# Patient Record
Sex: MALE | Race: WHITE | NOT HISPANIC OR LATINO | Employment: OTHER | URBAN - METROPOLITAN AREA
[De-identification: names, ages, dates, MRNs, and addresses within clinical notes are randomized per-mention and may not be internally consistent; named-entity substitution may affect disease eponyms.]

---

## 2017-10-17 ENCOUNTER — ESTABLISHED COMPREHENSIVE EXAM (OUTPATIENT)
Dept: URBAN - METROPOLITAN AREA CLINIC 39 | Facility: CLINIC | Age: 72
End: 2017-10-17

## 2017-10-17 DIAGNOSIS — H43.813: ICD-10-CM

## 2017-10-17 DIAGNOSIS — H26.491: ICD-10-CM

## 2017-10-17 DIAGNOSIS — H40.011: ICD-10-CM

## 2017-10-17 DIAGNOSIS — H26.492: ICD-10-CM

## 2017-10-17 PROCEDURE — 92133 CPTRZD OPH DX IMG PST SGM ON: CPT

## 2017-10-17 PROCEDURE — 92014 COMPRE OPH EXAM EST PT 1/>: CPT

## 2017-10-17 PROCEDURE — G8428 CUR MEDS NOT DOCUMENT: HCPCS

## 2017-10-17 PROCEDURE — 92015 DETERMINE REFRACTIVE STATE: CPT

## 2017-10-17 ASSESSMENT — TONOMETRY
OD_IOP_MMHG: 14
OS_IOP_MMHG: 14

## 2017-10-17 ASSESSMENT — VISUAL ACUITY
OD_SC: J1
OD_SC: 20/20
OS_GLARE: 20/80
OS_SC: J1
OS_SC: 20/20-1
OD_GLARE: 20/80

## 2017-11-15 ENCOUNTER — SURGERY/PROCEDURE (OUTPATIENT)
Dept: URBAN - METROPOLITAN AREA CLINIC 39 | Facility: CLINIC | Age: 72
End: 2017-11-15

## 2017-11-15 DIAGNOSIS — H26.491: ICD-10-CM

## 2017-11-15 PROCEDURE — 66821 AFTER CATARACT LASER SURGERY: CPT

## 2017-11-16 ENCOUNTER — ESTABLISHED PATIENT (OUTPATIENT)
Dept: URBAN - METROPOLITAN AREA CLINIC 39 | Facility: CLINIC | Age: 72
End: 2017-11-16

## 2017-11-16 ENCOUNTER — SURGERY/PROCEDURE (OUTPATIENT)
Dept: URBAN - METROPOLITAN AREA CLINIC 39 | Facility: CLINIC | Age: 72
End: 2017-11-16

## 2017-11-16 DIAGNOSIS — H26.492: ICD-10-CM

## 2017-11-16 PROCEDURE — G8428 CUR MEDS NOT DOCUMENT: HCPCS

## 2017-11-16 PROCEDURE — 92012 INTRM OPH EXAM EST PATIENT: CPT

## 2017-11-16 PROCEDURE — 66821 AFTER CATARACT LASER SURGERY: CPT

## 2017-11-16 ASSESSMENT — VISUAL ACUITY
OS_SC: 20/20-2
OD_SC: 20/20
OD_SC: J1
OS_SC: J1
OS_GLARE: 20/70

## 2017-11-16 ASSESSMENT — TONOMETRY
OD_IOP_MMHG: 10
OS_IOP_MMHG: 14

## 2017-11-17 ENCOUNTER — POST-OP (OUTPATIENT)
Dept: URBAN - METROPOLITAN AREA CLINIC 39 | Facility: CLINIC | Age: 72
End: 2017-11-17

## 2017-11-17 DIAGNOSIS — Z98.890: ICD-10-CM

## 2017-11-17 PROCEDURE — 99024 POSTOP FOLLOW-UP VISIT: CPT

## 2017-11-17 ASSESSMENT — TONOMETRY
OS_IOP_MMHG: 12
OD_IOP_MMHG: 11

## 2017-11-17 ASSESSMENT — VISUAL ACUITY
OD_SC: J1
OS_SC: 20/20-2
OS_SC: J1
OD_SC: 20/20-2

## 2018-05-21 ENCOUNTER — APPOINTMENT (OUTPATIENT)
Dept: URBAN - METROPOLITAN AREA CLINIC 200 | Age: 73
Setting detail: DERMATOLOGY
End: 2018-05-29

## 2018-05-21 DIAGNOSIS — L57.0 ACTINIC KERATOSIS: ICD-10-CM

## 2018-05-21 PROBLEM — L20.84 INTRINSIC (ALLERGIC) ECZEMA: Status: ACTIVE | Noted: 2018-05-21

## 2018-05-21 PROCEDURE — 17110 DESTRUCT B9 LESION 1-14: CPT

## 2018-05-21 PROCEDURE — OTHER LIQUID NITROGEN: OTHER

## 2018-05-21 ASSESSMENT — LOCATION SIMPLE DESCRIPTION DERM: LOCATION SIMPLE: LEFT LOWER BACK

## 2018-05-21 ASSESSMENT — LOCATION DETAILED DESCRIPTION DERM: LOCATION DETAILED: LEFT SUPERIOR MEDIAL LOWER BACK

## 2018-05-21 ASSESSMENT — LOCATION ZONE DERM: LOCATION ZONE: TRUNK

## 2018-05-21 NOTE — PROCEDURE: LIQUID NITROGEN
Post-Care Instructions: I reviewed with the patient in detail post-care instructions. Patient is to wear sunprotection, and avoid picking at any of the treated lesions. Pt may apply Vaseline to crusted or scabbing areas.
Include Z78.9 (Other Specified Conditions Influencing Health Status) As An Associated Diagnosis?: Yes
Add 52 Modifier (Optional): no
Medical Necessity Clause: This procedure was medically necessary because the lesions that were treated were: causing pain
Number Of Freeze-Thaw Cycles: 2 freeze-thaw cycles
Medical Necessity Information: It is in your best interest to select a reason for this procedure from the list below. All of these items fulfill various CMS LCD requirements except the new and changing color options.
Consent: The patient's consent was obtained including but not limited to risks of crusting, scabbing, blistering, scarring, darker or lighter pigmentary change, recurrence, incomplete removal and infection.
Detail Level: Detailed

## 2018-11-13 ENCOUNTER — ESTABLISHED COMPREHENSIVE EXAM (OUTPATIENT)
Dept: URBAN - METROPOLITAN AREA CLINIC 39 | Facility: CLINIC | Age: 73
End: 2018-11-13

## 2018-11-13 DIAGNOSIS — Z96.1: ICD-10-CM

## 2018-11-13 DIAGNOSIS — H40.013: ICD-10-CM

## 2018-11-13 DIAGNOSIS — H43.813: ICD-10-CM

## 2018-11-13 DIAGNOSIS — H04.123: ICD-10-CM

## 2018-11-13 PROCEDURE — G8428 CUR MEDS NOT DOCUMENT: HCPCS

## 2018-11-13 PROCEDURE — 92015 DETERMINE REFRACTIVE STATE: CPT

## 2018-11-13 PROCEDURE — 92133 CPTRZD OPH DX IMG PST SGM ON: CPT

## 2018-11-13 PROCEDURE — 92014 COMPRE OPH EXAM EST PT 1/>: CPT

## 2018-11-13 PROCEDURE — G8785 BP SCRN NO PERF AT INTERVAL: HCPCS

## 2018-11-13 ASSESSMENT — VISUAL ACUITY
OS_SC: J1
OD_SC: 20/20-2
OD_SC: J1
OS_SC: 20/20-1

## 2018-11-13 ASSESSMENT — TONOMETRY
OD_IOP_MMHG: 12
OS_IOP_MMHG: 12

## 2019-11-19 ENCOUNTER — ESTABLISHED COMPREHENSIVE EXAM (OUTPATIENT)
Dept: URBAN - METROPOLITAN AREA CLINIC 39 | Facility: CLINIC | Age: 74
End: 2019-11-19

## 2019-11-19 DIAGNOSIS — Z98.890: ICD-10-CM

## 2019-11-19 DIAGNOSIS — H40.013: ICD-10-CM

## 2019-11-19 DIAGNOSIS — H04.123: ICD-10-CM

## 2019-11-19 PROCEDURE — 92014 COMPRE OPH EXAM EST PT 1/>: CPT

## 2019-11-19 PROCEDURE — 92015 DETERMINE REFRACTIVE STATE: CPT

## 2019-11-19 PROCEDURE — 92133 CPTRZD OPH DX IMG PST SGM ON: CPT

## 2019-11-19 ASSESSMENT — VISUAL ACUITY
OD_SC: J1
OS_SC: J1
OD_SC: 20/20-2
OS_SC: 20/20-1

## 2019-11-19 ASSESSMENT — TONOMETRY
OD_IOP_MMHG: 20
OS_IOP_MMHG: 18

## 2020-11-17 ENCOUNTER — ESTABLISHED COMPREHENSIVE EXAM (OUTPATIENT)
Dept: URBAN - METROPOLITAN AREA CLINIC 39 | Facility: CLINIC | Age: 75
End: 2020-11-17

## 2020-11-17 DIAGNOSIS — H35.362: ICD-10-CM

## 2020-11-17 DIAGNOSIS — H04.123: ICD-10-CM

## 2020-11-17 DIAGNOSIS — H43.813: ICD-10-CM

## 2020-11-17 DIAGNOSIS — H40.013: ICD-10-CM

## 2020-11-17 DIAGNOSIS — Z96.1: ICD-10-CM

## 2020-11-17 PROCEDURE — 92250 FUNDUS PHOTOGRAPHY W/I&R: CPT

## 2020-11-17 PROCEDURE — 92134 CPTRZ OPH DX IMG PST SGM RTA: CPT

## 2020-11-17 PROCEDURE — 92014 COMPRE OPH EXAM EST PT 1/>: CPT

## 2020-11-17 PROCEDURE — 92083 EXTENDED VISUAL FIELD XM: CPT

## 2020-11-17 ASSESSMENT — TONOMETRY
OS_IOP_MMHG: 15
OD_IOP_MMHG: 15

## 2020-11-17 ASSESSMENT — VISUAL ACUITY
OD_SC: 20/20
OS_SC: J1
OS_SC: 20/20-1
OD_SC: J1
OU_SC: 20/20
OU_SC: J1

## 2021-11-30 ENCOUNTER — ESTABLISHED COMPREHENSIVE EXAM (OUTPATIENT)
Dept: URBAN - METROPOLITAN AREA CLINIC 39 | Facility: CLINIC | Age: 76
End: 2021-11-30

## 2021-11-30 DIAGNOSIS — H35.362: ICD-10-CM

## 2021-11-30 DIAGNOSIS — H40.013: ICD-10-CM

## 2021-11-30 DIAGNOSIS — H43.813: ICD-10-CM

## 2021-11-30 DIAGNOSIS — Z96.1: ICD-10-CM

## 2021-11-30 DIAGNOSIS — H04.123: ICD-10-CM

## 2021-11-30 PROCEDURE — 92250 FUNDUS PHOTOGRAPHY W/I&R: CPT

## 2021-11-30 PROCEDURE — 92015 DETERMINE REFRACTIVE STATE: CPT

## 2021-11-30 PROCEDURE — 92133 CPTRZD OPH DX IMG PST SGM ON: CPT

## 2021-11-30 PROCEDURE — 99214 OFFICE O/P EST MOD 30 MIN: CPT

## 2021-11-30 ASSESSMENT — VISUAL ACUITY
OD_SC: 20/20-1
OS_SC: J1
OU_SC: J1
OS_SC: 20/20-1
OU_SC: 20/20
OD_SC: J1

## 2021-11-30 ASSESSMENT — TONOMETRY
OD_IOP_MMHG: 18
OS_IOP_MMHG: 17

## 2022-12-20 ENCOUNTER — COMPREHENSIVE EXAM (OUTPATIENT)
Dept: URBAN - METROPOLITAN AREA CLINIC 39 | Facility: CLINIC | Age: 77
End: 2022-12-20

## 2022-12-20 PROCEDURE — 92015 DETERMINE REFRACTIVE STATE: CPT

## 2022-12-20 PROCEDURE — 92014 COMPRE OPH EXAM EST PT 1/>: CPT

## 2022-12-20 PROCEDURE — 92250 FUNDUS PHOTOGRAPHY W/I&R: CPT

## 2022-12-20 ASSESSMENT — VISUAL ACUITY
OS_SC: 20/25+2
OS_SC: J1
OD_SC: 20/20-1
OD_SC: J1+
OU_SC: 20/20-2
OU_SC: J1+

## 2022-12-20 ASSESSMENT — TONOMETRY
OD_IOP_MMHG: 17
OS_IOP_MMHG: 16

## 2023-04-11 ENCOUNTER — FOLLOW UP (OUTPATIENT)
Dept: URBAN - METROPOLITAN AREA CLINIC 39 | Facility: CLINIC | Age: 78
End: 2023-04-11

## 2023-04-11 DIAGNOSIS — H40.023: ICD-10-CM

## 2023-04-11 DIAGNOSIS — H04.123: ICD-10-CM

## 2023-04-11 PROCEDURE — 92012 INTRM OPH EXAM EST PATIENT: CPT

## 2023-04-11 PROCEDURE — 76514 ECHO EXAM OF EYE THICKNESS: CPT

## 2023-04-11 PROCEDURE — 92020 GONIOSCOPY: CPT

## 2023-04-11 PROCEDURE — 92083 EXTENDED VISUAL FIELD XM: CPT

## 2023-04-11 ASSESSMENT — PACHYMETRY
OD_CT_UM: 554
OS_CT_UM: 552

## 2023-04-11 ASSESSMENT — TONOMETRY
OS_IOP_MMHG: 15
OD_IOP_MMHG: 18

## 2023-04-11 ASSESSMENT — VISUAL ACUITY
OD_SC: 20/20
OS_SC: 20/25-2
OU_SC: 20/20

## 2023-11-07 ENCOUNTER — COMPREHENSIVE EXAM (OUTPATIENT)
Dept: URBAN - METROPOLITAN AREA CLINIC 39 | Facility: CLINIC | Age: 78
End: 2023-11-07

## 2023-11-07 DIAGNOSIS — H35.362: ICD-10-CM

## 2023-11-07 DIAGNOSIS — H40.023: ICD-10-CM

## 2023-11-07 DIAGNOSIS — H04.123: ICD-10-CM

## 2023-11-07 DIAGNOSIS — Z96.1: ICD-10-CM

## 2023-11-07 DIAGNOSIS — H43.813: ICD-10-CM

## 2023-11-07 PROCEDURE — 92014 COMPRE OPH EXAM EST PT 1/>: CPT

## 2023-11-07 PROCEDURE — 92250 FUNDUS PHOTOGRAPHY W/I&R: CPT

## 2023-11-07 ASSESSMENT — VISUAL ACUITY
OS_SC: 20/25+2
OU_SC: 20/15-1
OD_SC: 20/20
OS_SC: J1
OU_SC: J1+
OD_SC: J1+

## 2023-11-07 ASSESSMENT — TONOMETRY
OD_IOP_MMHG: 19
OS_IOP_MMHG: 16

## 2024-04-24 ENCOUNTER — FOLLOW UP (OUTPATIENT)
Dept: URBAN - METROPOLITAN AREA CLINIC 39 | Facility: CLINIC | Age: 79
End: 2024-04-24

## 2024-04-24 DIAGNOSIS — H04.123: ICD-10-CM

## 2024-04-24 DIAGNOSIS — H40.023: ICD-10-CM

## 2024-04-24 PROCEDURE — 92012 INTRM OPH EXAM EST PATIENT: CPT

## 2024-04-24 PROCEDURE — 92083 EXTENDED VISUAL FIELD XM: CPT

## 2024-04-24 ASSESSMENT — VISUAL ACUITY
OS_SC: 20/20-2
OD_SC: 20/20-1
OU_SC: 20/20

## 2024-04-24 ASSESSMENT — TONOMETRY
OD_IOP_MMHG: 17
OS_IOP_MMHG: 16

## 2024-11-19 ENCOUNTER — COMPREHENSIVE EXAM (OUTPATIENT)
Dept: URBAN - METROPOLITAN AREA CLINIC 39 | Facility: CLINIC | Age: 79
End: 2024-11-19

## 2024-11-19 DIAGNOSIS — H04.123: ICD-10-CM

## 2024-11-19 DIAGNOSIS — H35.362: ICD-10-CM

## 2024-11-19 DIAGNOSIS — H43.813: ICD-10-CM

## 2024-11-19 DIAGNOSIS — Z96.1: ICD-10-CM

## 2024-11-19 DIAGNOSIS — H40.023: ICD-10-CM

## 2024-11-19 PROCEDURE — 92014 COMPRE OPH EXAM EST PT 1/>: CPT

## 2024-11-19 PROCEDURE — 92250 FUNDUS PHOTOGRAPHY W/I&R: CPT
